# Patient Record
Sex: MALE | Race: WHITE | NOT HISPANIC OR LATINO | URBAN - METROPOLITAN AREA
[De-identification: names, ages, dates, MRNs, and addresses within clinical notes are randomized per-mention and may not be internally consistent; named-entity substitution may affect disease eponyms.]

---

## 2018-01-16 NOTE — MISCELLANEOUS
Message   Recorded as Task   Date: 02/03/2016 12:29 PM, Created By: Joelle Ramirez   Task Name: Call Back   Assigned To: [address]   Regarding Patient: Amelia Smith, Status: Active   CommentChad Pesa - 03 Feb 2016 12:29 PM     TASK CREATED  I refilled ADD med for this month only  No showed for f/u  Needs to schedule and keep f/u to receive any further refills  Lacy Torres - 03 Feb 2016 1:00 PM     TASK EDITED  LM informing pt        Active Problems    1  Anxiety (300 00) (F41 9)   2  Attention deficit disorder (ADD) (314 00) (F90 0)   3  Decreased sex drive (443 78) (O86 86)   4  Flu vaccine need (V04 81) (Z23)   5  Need for diphtheria-tetanus-pertussis (Tdap) vaccine, adult/adolescent (V06 1) (Z23)    Current Meds   1  Methylphenidate HCl ER 36 MG Oral Tablet Extended Release 24 Hour; Take 1 tablet   daily; Therapy: [id number] to (Evaluate:04Mar2016); Last Rx:[id number] Ordered    Allergies    1   No Known Drug Allergies    Signatures   Electronically signed by : Margo Gray; Feb  3 2016  1:28PM EST                       (Author)

## 2024-10-29 ENCOUNTER — TELEPHONE (OUTPATIENT)
Dept: FAMILY MEDICINE CLINIC | Facility: HOSPITAL | Age: 39
End: 2024-10-29

## 2024-10-29 NOTE — TELEPHONE ENCOUNTER
----- Message from ROXANE Edmonds sent at 10/28/2024 10:06 AM EDT -----  Pt has not been seen since 2016. Please call pt to see if he is still a pt here. If so then a PE must be scheduled.

## 2024-10-29 NOTE — TELEPHONE ENCOUNTER
Per care everywhere patient is now seeing [facility] for primary care.    Message being sent to [facility] to remove pcp.

## 2024-10-29 NOTE — TELEPHONE ENCOUNTER
Per care everywhere patient is now seeing [facility] for pcp.    Please remove Lacy Jo as pcp.

## 2024-10-31 NOTE — TELEPHONE ENCOUNTER
10/30/24 9:38 PM     The office's request has been received, reviewed, and the patient chart updated. The PCP has successfully been removed with a patient attribution note. This message will now be completed.    Thank you  Krystal Post